# Patient Record
Sex: FEMALE | URBAN - NONMETROPOLITAN AREA
[De-identification: names, ages, dates, MRNs, and addresses within clinical notes are randomized per-mention and may not be internally consistent; named-entity substitution may affect disease eponyms.]

---
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
## 2025-03-19 NOTE — TELEPHONE ENCOUNTER
Called lv   
Patient returning missed call from the office.  
Vandana's sister Kym, on hippa called because Vandana is vomiting and her stomach is hurting. She has a cyst on her right ovary and wanted to make sure that was not the reason.     Please christianne her back at [phone]    Thank you       
No